# Patient Record
Sex: FEMALE
[De-identification: names, ages, dates, MRNs, and addresses within clinical notes are randomized per-mention and may not be internally consistent; named-entity substitution may affect disease eponyms.]

---

## 2023-08-31 ENCOUNTER — APPOINTMENT (OUTPATIENT)
Dept: OBGYN | Facility: CLINIC | Age: 48
End: 2023-08-31

## 2024-03-23 LAB — HBA1C MFR BLD HPLC: 6.4

## 2024-04-02 DIAGNOSIS — Z83.3 FAMILY HISTORY OF DIABETES MELLITUS: ICD-10-CM

## 2024-04-02 DIAGNOSIS — F17.200 NICOTINE DEPENDENCE, UNSPECIFIED, UNCOMPLICATED: ICD-10-CM

## 2024-04-02 DIAGNOSIS — Z80.9 FAMILY HISTORY OF MALIGNANT NEOPLASM, UNSPECIFIED: ICD-10-CM

## 2024-04-02 DIAGNOSIS — Z85.41 PERSONAL HISTORY OF MALIGNANT NEOPLASM OF CERVIX UTERI: ICD-10-CM

## 2024-04-02 DIAGNOSIS — J30.2 OTHER SEASONAL ALLERGIC RHINITIS: ICD-10-CM

## 2024-04-02 DIAGNOSIS — Z82.49 FAMILY HISTORY OF ISCHEMIC HEART DISEASE AND OTHER DISEASES OF THE CIRCULATORY SYSTEM: ICD-10-CM

## 2024-04-02 RX ORDER — ONDANSETRON 4 MG/1
4 TABLET ORAL
Refills: 0 | Status: ACTIVE | COMMUNITY

## 2024-04-02 RX ORDER — OMEPRAZOLE 40 MG/1
40 CAPSULE, DELAYED RELEASE ORAL
Qty: 90 | Refills: 3 | Status: ACTIVE | COMMUNITY

## 2024-04-03 ENCOUNTER — APPOINTMENT (OUTPATIENT)
Dept: CARDIOLOGY | Facility: CLINIC | Age: 49
End: 2024-04-03
Payer: MEDICAID

## 2024-04-03 ENCOUNTER — NON-APPOINTMENT (OUTPATIENT)
Age: 49
End: 2024-04-03

## 2024-04-03 VITALS
BODY MASS INDEX: 36.5 KG/M2 | DIASTOLIC BLOOD PRESSURE: 72 MMHG | HEART RATE: 74 BPM | WEIGHT: 206 LBS | SYSTOLIC BLOOD PRESSURE: 128 MMHG | HEIGHT: 63 IN | OXYGEN SATURATION: 96 %

## 2024-04-03 VITALS
HEART RATE: 74 BPM | HEIGHT: 63 IN | OXYGEN SATURATION: 96 % | WEIGHT: 206 LBS | DIASTOLIC BLOOD PRESSURE: 80 MMHG | SYSTOLIC BLOOD PRESSURE: 134 MMHG | BODY MASS INDEX: 36.5 KG/M2

## 2024-04-03 VITALS — DIASTOLIC BLOOD PRESSURE: 72 MMHG | SYSTOLIC BLOOD PRESSURE: 128 MMHG

## 2024-04-03 DIAGNOSIS — F32.9 MAJOR DEPRESSIVE DISORDER, SINGLE EPISODE, UNSPECIFIED: ICD-10-CM

## 2024-04-03 DIAGNOSIS — Z82.49 FAMILY HISTORY OF ISCHEMIC HEART DISEASE AND OTHER DISEASES OF THE CIRCULATORY SYSTEM: ICD-10-CM

## 2024-04-03 DIAGNOSIS — E78.5 HYPERLIPIDEMIA, UNSPECIFIED: ICD-10-CM

## 2024-04-03 DIAGNOSIS — K21.9 GASTRO-ESOPHAGEAL REFLUX DISEASE W/OUT ESOPHAGITIS: ICD-10-CM

## 2024-04-03 DIAGNOSIS — I45.10 UNSPECIFIED RIGHT BUNDLE-BRANCH BLOCK: ICD-10-CM

## 2024-04-03 DIAGNOSIS — G47.33 OBSTRUCTIVE SLEEP APNEA (ADULT) (PEDIATRIC): ICD-10-CM

## 2024-04-03 DIAGNOSIS — Z00.00 ENCOUNTER FOR GENERAL ADULT MEDICAL EXAMINATION W/OUT ABNORMAL FINDINGS: ICD-10-CM

## 2024-04-03 DIAGNOSIS — R06.00 DYSPNEA, UNSPECIFIED: ICD-10-CM

## 2024-04-03 DIAGNOSIS — J44.9 CHRONIC OBSTRUCTIVE PULMONARY DISEASE, UNSPECIFIED: ICD-10-CM

## 2024-04-03 DIAGNOSIS — R55 SYNCOPE AND COLLAPSE: ICD-10-CM

## 2024-04-03 DIAGNOSIS — R07.89 OTHER CHEST PAIN: ICD-10-CM

## 2024-04-03 DIAGNOSIS — Z72.3 LACK OF PHYSICAL EXERCISE: ICD-10-CM

## 2024-04-03 LAB — HBA1C MFR BLD HPLC: 6.7

## 2024-04-03 PROCEDURE — 93000 ELECTROCARDIOGRAM COMPLETE: CPT

## 2024-04-03 PROCEDURE — 99204 OFFICE O/P NEW MOD 45 MIN: CPT

## 2024-04-03 RX ORDER — ATORVASTATIN CALCIUM 40 MG/1
40 TABLET, FILM COATED ORAL
Qty: 90 | Refills: 3 | Status: ACTIVE | COMMUNITY
Start: 2024-04-03 | End: 1900-01-01

## 2024-04-03 NOTE — DISCUSSION/SUMMARY
[FreeTextEntry1] :  48 F with severe sleep apnea (STOPBANG 7) characterized by excessive daytime fatigue, oropharyngeal congestion, poor sleep, loud snoring, a profoundly hypothyroidism for which patient has not yet seen endocrinologist, profound hyperlipidemia, likely in part caused by hypothyroid state, who was referred to our office for TTE as part of syncope eval.   Patient's episodes of presyncope / instability with changing position are of low priority compared to patient's thyroid dysfunction -- particularly if recent 03/2024 lab abnormalities haven't been acted upon. We will work with patient to make sure she has timely follow-up. In an effort to empirically reduce lipids, I'll prescribe atorvastatin 40 mg daily.   Plan:   - Sleep medicine referral to help sort out need for BIPAP over CPAP   - Start atorvastatin 40 mg daily  - Transthoracic echo ordered   - Continue other medication regimen  - Strict call-back precaution if she does not obtain a endocrinology appt within the next 1-2 weeks.    RTC: 1 month  Appreciate the opportunity to participate in the care of Ms. HORN. Strict ER precautions were provided to patient for symptoms that arise or worsen. Please do not hesitate to reach out with any questions, concerns, or changes in clinical status.   Rashad Wolf MD, [facility]  Interventional & Clinical Cardiology  Altogether, I had the privilege of spending 50 minutes on this patient's care, more than 50% of which was during a face-to-face encounter. This does not include time required to obtain/interpret an ECG or time invested in the education of medical trainees who may have participated in the patient's care.

## 2024-04-03 NOTE — HISTORY OF PRESENT ILLNESS
[FreeTextEntry1] : 48 F with history of COPD, GERD, HLD, hypothyroidism, MDD, DILLON who presents to [facility] Cardiology Office in [place] for cardiovascular evaluation of syncopal episodes.   Presently, patient denies any chest pain, palpitations, lightheadedness, dizziness. In describing her prior syncopal episodes, patient reports that the "syncope" may also be related to the instability she feels with her cervical spine and other back issues. No head trauma 2/2 loss of consciousness. Symptoms more consistent with instability / presyncope.   Thyroid dysfunction is profound and likely behind deranged lipid profile. Needs endocrinology eval asap.   Per referral documentation from Dr. Dunphy's office (03/14/24):   # Vitals:  /80, HR 63, Wt 203 lbs.    # Medications:  1) Rosuvastatin 20 mg qd,  2) Ropinirole 2 mg, 2 tabs daily 3) Amitriptyline 25 mg, 2 tabs at bedtime  4) Levothyroxine 125 mcg qAC   # Labs:  Hep A Ab IgM non-reactive // Hep A Ab total *reactive* (likely old infection) Hep C Ab Non-reactive  Chol 412  HDL 45    Creat 0.98  Hgb 15.3   (high), free thyroxine 0.14 (low)  A1c 6.4

## 2024-04-03 NOTE — CARDIOLOGY SUMMARY
[de-identified] : 04/03/24 : SR with LAD, poor R wave progression (cannot rule out pulmonary dz or old anterior infarct).

## 2024-04-03 NOTE — CARDIOLOGY SUMMARY
[de-identified] : 04/03/24 : SR with LAD, poor R wave progression (cannot rule out pulmonary dz or old anterior infarct).

## 2024-04-03 NOTE — DISCUSSION/SUMMARY
[FreeTextEntry1] :  48 F with severe sleep apnea (STOPBANG 7) characterized by excessive daytime fatigue, oropharyngeal congestion, poor sleep, loud snoring, a profoundly hypothyroidism for which patient has not yet seen endocrinologist, profound hyperlipidemia, likely in part caused by hypothyroid state, who was referred to our office for TTE as part of syncope eval.   Patient's episodes of presyncope / instability with changing position are of low priority compared to patient's thyroid dysfunction -- particularly if recent 03/2024 lab abnormalities haven't been acted upon. We will work with patient to make sure she has timely follow-up. In an effort to empirically reduce lipids, I'll prescribe atorvastatin 40 mg daily.   Plan:   - Sleep medicine referral to help sort out need for BIPAP over CPAP   - Start atorvastatin 40 mg daily  - Transthoracic echo ordered   - Continue other medication regimen  - Strict call-back precaution if she does not obtain a endocrinology appt within the next 1-2 weeks.    RTC: 1 month  Appreciate the opportunity to participate in the care of Ms. HORN. Strict ER precautions were provided to patient for symptoms that arise or worsen. Please do not hesitate to reach out with any questions, concerns, or changes in clinical status.   Rashad Wolf MD, [facility]  Interventional & Clinical Cardiology  Altogether, I had the privilege of spending 50 minutes on this patient's care, more than 50% of which was during a face-to-face encounter. This does not include time required to obtain/interpret an ECG or time invested in the education of medical trainees who may have participated in the patient's care. [EKG obtained to assist in diagnosis and management of assessed problem(s)] : EKG obtained to assist in diagnosis and management of assessed problem(s)

## 2024-04-03 NOTE — HISTORY OF PRESENT ILLNESS
[FreeTextEntry1] : 48 F with history of COPD, GERD, HLD, hypothyroidism, MDD, DILLON who presents to [facility] Cardiology Office in [place] for cardiovascular evaluation of syncopal episodes.   Presently, patient denies any chest pain, palpitations, lightheadedness, dizziness. In describing her prior syncopal episodes, patient reports that the "syncope" may also be related to the instability she feels with her cervical spine and other back issues. No head trauma 2/2 loss of consciousness. Symptoms more consistent with instability / presyncope.   Thyroid dysfunction is profound and likely behind deranged lipid profile. Needs endocrinology eval asap.   Per referral documentation from Dr. Dunphy's office (03/14/24):   # Vitals:  /80, HR 63, Wt 203 lbs.    # Medications:  1) Rosuvastatin 20 mg qd,  2) Ropinirole 2 mg, 2 tabs daily 3) Amitriptyline 25 mg, 2 tabs at bedtime  4) Levothyroxine 125 mcg qAC   # Labs:  Hep A Ab IgM non-reactive // Hep A Ab total *reactive* (likely old infection) Hep C Ab Non-reactive  Chol 412  HDL 45    Creat 0.98  Hgb 15.3   (high), free thyroxine 0.14 (low)  A1c 6.4

## 2024-04-04 ENCOUNTER — TRANSCRIPTION ENCOUNTER (OUTPATIENT)
Age: 49
End: 2024-04-04

## 2024-04-15 ENCOUNTER — NON-APPOINTMENT (OUTPATIENT)
Age: 49
End: 2024-04-15

## 2024-05-01 ENCOUNTER — APPOINTMENT (OUTPATIENT)
Dept: CARDIOLOGY | Facility: CLINIC | Age: 49
End: 2024-05-01
Payer: MEDICAID

## 2024-05-01 VITALS
OXYGEN SATURATION: 96 % | WEIGHT: 200 LBS | BODY MASS INDEX: 35.44 KG/M2 | HEIGHT: 63 IN | DIASTOLIC BLOOD PRESSURE: 68 MMHG | HEART RATE: 71 BPM | SYSTOLIC BLOOD PRESSURE: 122 MMHG

## 2024-05-01 PROCEDURE — 93306 TTE W/DOPPLER COMPLETE: CPT

## 2024-05-01 PROCEDURE — G2211 COMPLEX E/M VISIT ADD ON: CPT | Mod: NC,1L

## 2024-05-01 PROCEDURE — 99214 OFFICE O/P EST MOD 30 MIN: CPT

## 2024-05-08 ENCOUNTER — APPOINTMENT (OUTPATIENT)
Dept: ENDOCRINOLOGY | Facility: CLINIC | Age: 49
End: 2024-05-08
Payer: MEDICAID

## 2024-05-08 VITALS
WEIGHT: 203 LBS | BODY MASS INDEX: 35.97 KG/M2 | OXYGEN SATURATION: 94 % | HEART RATE: 68 BPM | HEIGHT: 63 IN | SYSTOLIC BLOOD PRESSURE: 158 MMHG | DIASTOLIC BLOOD PRESSURE: 96 MMHG

## 2024-05-08 DIAGNOSIS — Z82.3 FAMILY HISTORY OF STROKE: ICD-10-CM

## 2024-05-08 DIAGNOSIS — Z83.438 FAMILY HISTORY OF OTHER DISORDER OF LIPOPROTEIN METABOLISM AND OTHER LIPIDEMIA: ICD-10-CM

## 2024-05-08 DIAGNOSIS — H91.90 UNSPECIFIED HEARING LOSS, UNSPECIFIED EAR: ICD-10-CM

## 2024-05-08 DIAGNOSIS — F41.9 ANXIETY DISORDER, UNSPECIFIED: ICD-10-CM

## 2024-05-08 DIAGNOSIS — Z86.39 PERSONAL HISTORY OF OTHER ENDOCRINE, NUTRITIONAL AND METABOLIC DISEASE: ICD-10-CM

## 2024-05-08 DIAGNOSIS — E04.2 NONTOXIC MULTINODULAR GOITER: ICD-10-CM

## 2024-05-08 DIAGNOSIS — E03.9 HYPOTHYROIDISM, UNSPECIFIED: ICD-10-CM

## 2024-05-08 PROCEDURE — 99204 OFFICE O/P NEW MOD 45 MIN: CPT

## 2024-05-08 NOTE — ASSESSMENT
[FreeTextEntry1] :  - Hypothyroidism  , ? FT4  No h/o neck surgery  She was prescribed LT4 125 mcg but takes it maybe 3 to 4 days a week.  Full weight base LT4 dose is 150 mcg daily Plan:  -Take levothyroxine 150 mcg daily and repeat thyroid profile in 4 weeks.  -Will call you to discuss dose adjustments after labs are reviewed.  - Reviewed correct instructions to tale levothyroxine. Emphasized taking levothyroxine every day.  She was advised to take omeprazole around noon to improve levothyroxine absorption.   - h/o MNG No recent thyroid ultrasound.  Records from previous ultrasounds are not available for comparison.  Plan:  Thyroid ultrasound  -Weight management Given living situation and limited access to healthy diet finds weight management challenging.  Has sedentary lifestyle.  Ambulates with cane due to right knee injury.  She is in process of enrolling for sleep study and obtaining BiPAP.  Never tried weight loss medications.   Plan:  -Will check lipid panel, A1c, vitamin D level next appointment.   Follow up in 3 month

## 2024-05-08 NOTE — HISTORY OF PRESENT ILLNESS
[FreeTextEntry1] : 48 year old women, active smoker, with medical history of hypothyroidism, MNG, obesity (BMI 35), HLP, RBBB, COPD, MDD, h/o cervical cancer, GERD, hearing impairment (wear hearing aid) presented to establish care for hypothyroidism management.  Pt resides in shelter.   Labs May 2024- , FT4 ?  Hemodynamically stable   - Hypothyroidism  Dx'ed about 10 years ago. She has been on the same dose of LT4 for past 2 years. Intermittently off medication since she didn't have insurance when she moved from [place] to [place]. Takes levothyroxine 125 mcg. Admits to taking the medication about 3-4 days a week due to concern for LT4 causing abdominal pain and frequent defecation. Takes the medication on empty stomach an hour before eating. Takes Omeprazole 2 hours after LT4. Recently she developed constipation, fatigue, and some memory issues which attributes it to the psychoactive medications she stated taking.   No h/o neck surgery.   s/p endometrial ablation in 2005 for endometriosis and fibroids. Ovaries intact.  Has 2 children.   - MNG Reports having thyroid nodule. last US was 2 years ago. Report not available for review.    - HLP Takes Atorvastatin 40 mg daily  - Weight management BMI 35  Given that she lives in shelter and only has access to microwave, finds eating health challenging. She was able to lose 60 lb in 2022, but gradually regained about 30 lb back. She was using CPAP in the past. Currently in process of having sleep study and getting BiPAP-- follows up with pulmonology and cardiology.     Ambulates with cane.

## 2024-05-13 DIAGNOSIS — K76.0 FATTY (CHANGE OF) LIVER, NOT ELSEWHERE CLASSIFIED: ICD-10-CM

## 2024-05-13 DIAGNOSIS — Z77.22 CONTACT WITH AND (SUSPECTED) EXPOSURE TO ENVIRONMENTAL TOBACCO SMOKE (ACUTE) (CHRONIC): ICD-10-CM

## 2024-05-13 DIAGNOSIS — Z86.59 PERSONAL HISTORY OF OTHER MENTAL AND BEHAVIORAL DISORDERS: ICD-10-CM

## 2024-05-13 DIAGNOSIS — F17.210 NICOTINE DEPENDENCE, CIGARETTES, UNCOMPLICATED: ICD-10-CM

## 2024-05-13 DIAGNOSIS — Z86.79 PERSONAL HISTORY OF OTHER DISEASES OF THE CIRCULATORY SYSTEM: ICD-10-CM

## 2024-05-13 RX ORDER — LEVOTHYROXINE SODIUM 125 UG/1
125 CAPSULE ORAL DAILY
Refills: 0 | Status: ACTIVE | COMMUNITY

## 2024-05-13 RX ORDER — LEVOTHYROXINE SODIUM 0.15 MG/1
150 TABLET ORAL
Qty: 90 | Refills: 1 | Status: COMPLETED | COMMUNITY
Start: 2024-05-08 | End: 2024-05-13

## 2024-05-13 RX ORDER — ROPINIROLE 2 MG/1
2 TABLET, FILM COATED ORAL 3 TIMES DAILY
Refills: 0 | Status: ACTIVE | COMMUNITY

## 2024-05-13 RX ORDER — AMITRIPTYLINE HYDROCHLORIDE 25 MG/1
25 TABLET, FILM COATED ORAL
Refills: 0 | Status: ACTIVE | COMMUNITY

## 2024-05-13 RX ORDER — LINACLOTIDE 72 UG/1
CAPSULE, GELATIN COATED ORAL EVERY MORNING
Refills: 0 | Status: ACTIVE | COMMUNITY

## 2024-05-13 RX ORDER — SERTRALINE HYDROCHLORIDE 50 MG/1
50 TABLET, FILM COATED ORAL DAILY
Refills: 0 | Status: ACTIVE | COMMUNITY

## 2024-07-12 ENCOUNTER — APPOINTMENT (OUTPATIENT)
Dept: CARDIOLOGY | Facility: CLINIC | Age: 49
End: 2024-07-12

## 2024-07-23 RX ORDER — LEVOTHYROXINE SODIUM 0.15 MG/1
150 TABLET ORAL
Qty: 90 | Refills: 0 | Status: ACTIVE | COMMUNITY
Start: 2024-07-23 | End: 1900-01-01

## 2024-08-14 ENCOUNTER — APPOINTMENT (OUTPATIENT)
Dept: ENDOCRINOLOGY | Facility: CLINIC | Age: 49
End: 2024-08-14

## 2025-01-24 DIAGNOSIS — E03.9 HYPOTHYROIDISM, UNSPECIFIED: ICD-10-CM

## 2025-01-24 RX ORDER — LEVOTHYROXINE SODIUM 0.15 MG/1
150 TABLET ORAL
Qty: 30 | Refills: 5 | Status: ACTIVE | COMMUNITY
Start: 2025-01-24 | End: 1900-01-01

## 2025-03-29 ENCOUNTER — TRANSCRIPTION ENCOUNTER (OUTPATIENT)
Age: 50
End: 2025-03-29

## 2025-05-14 ENCOUNTER — NON-APPOINTMENT (OUTPATIENT)
Age: 50
End: 2025-05-14

## 2025-05-15 ENCOUNTER — APPOINTMENT (OUTPATIENT)
Dept: CARDIOLOGY | Facility: CLINIC | Age: 50
End: 2025-05-15
Payer: MEDICAID

## 2025-05-15 ENCOUNTER — NON-APPOINTMENT (OUTPATIENT)
Age: 50
End: 2025-05-15

## 2025-05-15 VITALS
HEART RATE: 74 BPM | OXYGEN SATURATION: 95 % | DIASTOLIC BLOOD PRESSURE: 90 MMHG | SYSTOLIC BLOOD PRESSURE: 172 MMHG | HEIGHT: 63 IN | BODY MASS INDEX: 35.44 KG/M2 | WEIGHT: 200 LBS

## 2025-05-15 DIAGNOSIS — G47.33 OBSTRUCTIVE SLEEP APNEA (ADULT) (PEDIATRIC): ICD-10-CM

## 2025-05-15 DIAGNOSIS — F41.9 ANXIETY DISORDER, UNSPECIFIED: ICD-10-CM

## 2025-05-15 DIAGNOSIS — R40.0 SOMNOLENCE: ICD-10-CM

## 2025-05-15 DIAGNOSIS — I45.10 UNSPECIFIED RIGHT BUNDLE-BRANCH BLOCK: ICD-10-CM

## 2025-05-15 DIAGNOSIS — R55 SYNCOPE AND COLLAPSE: ICD-10-CM

## 2025-05-15 DIAGNOSIS — K76.0 FATTY (CHANGE OF) LIVER, NOT ELSEWHERE CLASSIFIED: ICD-10-CM

## 2025-05-15 DIAGNOSIS — E03.9 HYPOTHYROIDISM, UNSPECIFIED: ICD-10-CM

## 2025-05-15 DIAGNOSIS — R06.83 SNORING: ICD-10-CM

## 2025-05-15 DIAGNOSIS — H91.90 UNSPECIFIED HEARING LOSS, UNSPECIFIED EAR: ICD-10-CM

## 2025-05-15 PROCEDURE — 99214 OFFICE O/P EST MOD 30 MIN: CPT

## 2025-05-15 PROCEDURE — G2211 COMPLEX E/M VISIT ADD ON: CPT | Mod: NC

## 2025-05-15 PROCEDURE — 93000 ELECTROCARDIOGRAM COMPLETE: CPT

## 2025-05-15 RX ORDER — LOSARTAN POTASSIUM 100 MG/1
100 TABLET, FILM COATED ORAL
Qty: 90 | Refills: 1 | Status: ACTIVE | COMMUNITY
Start: 2025-05-15 | End: 1900-01-01

## 2025-05-30 ENCOUNTER — NON-APPOINTMENT (OUTPATIENT)
Age: 50
End: 2025-05-30

## 2025-06-19 LAB — HBA1C MFR BLD HPLC: 6.6

## 2025-06-20 ENCOUNTER — APPOINTMENT (OUTPATIENT)
Dept: CARDIOLOGY | Facility: CLINIC | Age: 50
End: 2025-06-20

## 2025-06-20 RX ORDER — EZETIMIBE 10 MG/1
10 TABLET ORAL
Qty: 90 | Refills: 2 | Status: ACTIVE | COMMUNITY
Start: 2025-06-20 | End: 1900-01-01

## 2025-06-20 RX ORDER — SIMVASTATIN 20 MG/1
20 TABLET, FILM COATED ORAL
Qty: 90 | Refills: 2 | Status: ACTIVE | COMMUNITY
Start: 2025-06-20 | End: 1900-01-01